# Patient Record
Sex: FEMALE | Race: WHITE | NOT HISPANIC OR LATINO | Employment: UNEMPLOYED
[De-identification: names, ages, dates, MRNs, and addresses within clinical notes are randomized per-mention and may not be internally consistent; named-entity substitution may affect disease eponyms.]

---
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
## 2024-06-23 ENCOUNTER — HEALTH MAINTENANCE LETTER (OUTPATIENT)
Age: 19
End: 2024-06-23

## 2024-07-18 ENCOUNTER — OFFICE VISIT (OUTPATIENT)
Dept: FAMILY MEDICINE | Facility: CLINIC | Age: 19
End: 2024-07-18
Payer: COMMERCIAL

## 2024-07-18 VITALS
BODY MASS INDEX: 22.66 KG/M2 | OXYGEN SATURATION: 98 % | HEIGHT: 66 IN | SYSTOLIC BLOOD PRESSURE: 98 MMHG | TEMPERATURE: 97.6 F | DIASTOLIC BLOOD PRESSURE: 70 MMHG | RESPIRATION RATE: 18 BRPM | WEIGHT: 141 LBS | HEART RATE: 83 BPM

## 2024-07-18 DIAGNOSIS — E10.9 TYPE 1 DIABETES MELLITUS WITHOUT COMPLICATION (H): Primary | ICD-10-CM

## 2024-07-18 DIAGNOSIS — G43.909 MIGRAINE WITHOUT STATUS MIGRAINOSUS, NOT INTRACTABLE, UNSPECIFIED MIGRAINE TYPE: ICD-10-CM

## 2024-07-18 PROBLEM — K90.0 CELIAC DISEASE: Status: ACTIVE | Noted: 2020-05-14

## 2024-07-18 LAB — HBA1C MFR BLD: 6.4 % (ref 0–5.6)

## 2024-07-18 PROCEDURE — 36415 COLL VENOUS BLD VENIPUNCTURE: CPT | Performed by: FAMILY MEDICINE

## 2024-07-18 PROCEDURE — 99204 OFFICE O/P NEW MOD 45 MIN: CPT | Performed by: FAMILY MEDICINE

## 2024-07-18 PROCEDURE — 83036 HEMOGLOBIN GLYCOSYLATED A1C: CPT | Performed by: FAMILY MEDICINE

## 2024-07-18 RX ORDER — INSULIN LISPRO 100 [IU]/ML
INJECTION, SOLUTION INTRAVENOUS; SUBCUTANEOUS
COMMUNITY
Start: 2023-12-25 | End: 2024-07-18

## 2024-07-18 RX ORDER — GLUCAGON 3 MG/1
1 POWDER NASAL
COMMUNITY
Start: 2024-01-22

## 2024-07-18 RX ORDER — RIZATRIPTAN BENZOATE 10 MG/1
TABLET, ORALLY DISINTEGRATING ORAL
Status: CANCELLED | OUTPATIENT
Start: 2024-07-18

## 2024-07-18 RX ORDER — PROCHLORPERAZINE 25 MG/1
SUPPOSITORY RECTAL
COMMUNITY

## 2024-07-18 RX ORDER — LANCETS
EACH MISCELLANEOUS
Qty: 400 EACH | Refills: 11 | Status: SHIPPED | OUTPATIENT
Start: 2024-07-18

## 2024-07-18 RX ORDER — INSULIN PMP CART,AUT,G6/7,CNTR
EACH SUBCUTANEOUS
COMMUNITY

## 2024-07-18 RX ORDER — RIZATRIPTAN BENZOATE 10 MG/1
TABLET, ORALLY DISINTEGRATING ORAL
COMMUNITY
Start: 2023-08-04

## 2024-07-18 RX ORDER — RIZATRIPTAN BENZOATE 10 MG/1
10 TABLET, ORALLY DISINTEGRATING ORAL
Qty: 18 TABLET | Refills: 1 | Status: SHIPPED | OUTPATIENT
Start: 2024-07-18

## 2024-07-18 RX ORDER — INSULIN LISPRO 100 [IU]/ML
INJECTION, SOLUTION INTRAVENOUS; SUBCUTANEOUS
Qty: 30 ML | Refills: 1 | Status: SHIPPED | OUTPATIENT
Start: 2024-07-18 | End: 2024-08-26

## 2024-07-18 NOTE — PROGRESS NOTES
Assessment & Plan     Type 1 diabetes mellitus without complication (H)  Well controlled, new referral sent to endocrinology. Refilled testing supplies and insulin for pump  - Insulin Disposable Pump (OMNIPOD 5 G6 PODS, GEN 5,) MISC  - blood glucose (NO BRAND SPECIFIED) test strip; One Touch  - Adult Endocrinology [facility] hospital Referral; Future  - insulin lispro (HUMALOG KWIKPEN) 100 UNIT/ML (1 unit dial) KWIKPEN; For use with insulin pump: Carb ratio 1:15, correction factor 1:50 over 150. Total daily dose is 30 units  - blood glucose (NO BRAND SPECIFIED) test strip; Use to test blood sugar 4 times daily or as directed. To accompany: Blood Glucose Monitor Brands: One Touch Ultra Verio  - thin (NO BRAND SPECIFIED) lancets; Use to test blood sugar 4 times daily or as directed. To accompany: Blood Glucose Monitor Brands: One Touch Ultra Verio  - Hemoglobin A1c; Future    Migraine without status migrainosus, not intractable, unspecified migraine type  Stable, using PRN rizatriptan  - rizatriptan (MAXALT-MLT) 10 MG ODT; Take 1 tablet (10 mg) by mouth at onset of headache for migraine May repeat in 2 hours. Max 3 tablets/24 hours.      Subjective   Ernestina is a 18 year old, presenting for the following health issues:  Diabetes and Referral (To Endocrinology)        7/18/2024     7:08 AM   Additional Questions   Roomed by Cynthia MCLAUGHLIN       History of Present Illness       Diabetes:   She presents for follow up of diabetes.   She is checking home blood glucose with a continuous glucose monitor.   She checks blood glucose before and after meals and at bedtime.  Blood glucose is sometimes over 200 and sometimes under 70. She is aware of hypoglycemia symptoms including shakiness, dizziness, weakness and lethargy.   She is concerned about other.    She is not experiencing numbness or burning in feet, excessive thirst, blurry vision, weight changes or redness, sores or blisters on feet.       She was diagnosed in 2023. Felt increased  "thirst.       Her blood sugar have been consistently in range. Was seeing Gustavo Perez MD.   She isn't seeing many low blood sugars but relates any due to mis-counting carbs- she was able to correct it quickly. When she eats late at night, will notice higher  blood sugars but trying to control that as well.       Diabetes comment:  Has new insurance- needs to re establish care. Was seen at [facility]. Also seen in [place], [place] goes there to school for college.     Headaches:   Since the patient's last clinic visit, headaches are: No change  The patient is getting headaches:  A few times a month  She is not able to do normal daily activities when she has a migraine.  The patient is taking the following rescue/relief medications:  Maxal   Patient states \"I get some relief\" from the rescue/relief medications.   The patient is taking the following medications to prevent migraines:  No medications to prevent migraines  In the past 4 weeks, the patient has gone to an Urgent Care or Emergency Room 0 times times due to headaches.    She gets migraines a couple times per month. HA starts light in the AM and gradually worsens. Usually migraines are frontal on the left side but can involve the entire head.  No vision changes, lights, spots. She does get nausea/vomiting. Also gets sound and light and smell sensitivity and will need to lay down.     She eats 4 or more servings of fruits and vegetables daily.She consumes 1 sweetened beverage(s) daily.She exercises with enough effort to increase her heart rate 30 to 60 minutes per day.  She exercises with enough effort to increase her heart rate 5 days per week.   She is taking medications regularly.             Review of Systems  Constitutional, HEENT, cardiovascular, pulmonary, gi and gu systems are negative, except as otherwise noted.      Objective    BP 98/70   Pulse 83   Temp 97.6  F (36.4  C)   Resp 18   Ht 1.676 m (5' 6\")   Wt 64 kg (141 lb)   LMP " 07/05/2024   SpO2 98%   BMI 22.76 kg/m    Body mass index is 22.76 kg/m .  Physical Exam   GENERAL: alert and no distress  RESP: lungs clear to auscultation - no rales, rhonchi or wheezes  CV: regular rates and rhythm, normal S1 S2, no S3 or S4, and no murmur, click or rub  MS: no gross musculoskeletal defects noted, no edema  PSYCH: mentation appears normal, affect normal/bright          Signed Electronically by: Steve Culp DO

## 2024-07-22 ENCOUNTER — TELEPHONE (OUTPATIENT)
Dept: ENDOCRINOLOGY | Facility: CLINIC | Age: 19
End: 2024-07-22
Payer: COMMERCIAL

## 2024-07-22 NOTE — TELEPHONE ENCOUNTER
Left Voicemail (1st Attempt) for the patient to call back and schedule the following:    Appointment type: New Diabetes   Provider: Lonnie   Return date: 7/23 in the 2 back to back return slots on hold   Specialty phone number: [phone]  Additional appointment(s) needed: NA   Additonal Notes: LVM, MyC x1  For Shila: New Type 1 Female, 18 year old, 2005  MRN: [id number]    Example:  7/23 Lonnie virtual csc (in the hold per caitie triana 2 return back to back slots)    Queenie Yeboah on 7/22/2024 at 8:10 AM

## 2024-08-26 ENCOUNTER — MYC REFILL (OUTPATIENT)
Dept: FAMILY MEDICINE | Facility: CLINIC | Age: 19
End: 2024-08-26
Payer: COMMERCIAL

## 2024-08-26 DIAGNOSIS — E10.9 TYPE 1 DIABETES MELLITUS WITHOUT COMPLICATION (H): ICD-10-CM

## 2024-08-27 RX ORDER — INSULIN LISPRO 100 [IU]/ML
INJECTION, SOLUTION INTRAVENOUS; SUBCUTANEOUS
Qty: 30 ML | Refills: 1 | Status: SHIPPED | OUTPATIENT
Start: 2024-08-27

## 2024-11-07 NOTE — CONFIDENTIAL NOTE
RECORDS RECEIVED FROM: internal    DATE RECEIVED: 11.27.24    NOTES (FOR ALL VISITS) STATUS DETAILS   OFFICE NOTES from referring provider internal    Steve Culp,       OFFICE NOTES from other specialist feliciano Murillo- 1.22.24 paramjit      MEDICATION LIST internal     LABS     DIABETES: HBGA1C, CREATININE, FASTING LIPIDS, MICROALBUMIN URINE, POTASSIUM, TSH, T4    THYROID: TSH, T4, CBC, THYRODLONULIN, TOTAL T3, FREE T4, CALCITONIN, CEA internal  HBGA1C- 7/18/24  Bmp- 3.11.24  Lipid- 3.11.24

## 2024-11-10 ENCOUNTER — HEALTH MAINTENANCE LETTER (OUTPATIENT)
Age: 19
End: 2024-11-10

## 2024-11-14 NOTE — PROGRESS NOTES
Outcome for 11/14/24 3:08 PM: Tackk message sent  Evangelina Julian MA  Outcome for 11/25/24 1:20 PM: Per patient, will upload device before appointment  Bobbilynn Grossaint, VF  Outcome for 11/26/24 10:27 AM: Data uploaded on [email or web address]  Bobbilynn Grossaint, VF

## 2024-11-25 ENCOUNTER — TELEPHONE (OUTPATIENT)
Dept: ENDOCRINOLOGY | Facility: CLINIC | Age: 19
End: 2024-11-25
Payer: COMMERCIAL

## 2024-11-25 NOTE — PATIENT INSTRUCTIONS
New Diabetes Patient Info  Welcome to the Diabetes Optimization Program at the Endocrinology and Diabetes Clinic at [facility]!    Our Diabetes Optimization Program is here to provide you with a team-based, collaborative approach to optimize your diabetes management. The team is made up of Endocrinologists and Physician Assistants here at the Clinic, your Primary Care Physician, Certified Diabetes Educators, Registered Nurses, Medical Assistants, Emergency Medical Technicians and Visit Facilitators.     During your care with us, we promise to:   Work with you and your Primary Care Provider Steve Culp, DO Ref Provider (PCP)  to optimize your diabetes management.   Provide you with the tools and support you need to achieve a healthier lifestyle  Help you to control your diabetes by offering new treatments, education, and support to improve your diabetes management  Help you graduate back to your primary care provider for ongoing care once your diabetes is under control      Endocrinology Clinics Phone Number: [phone]    [facility] Address:   [facility] Address:     [address] [place] [id number] [place] [address]

## 2024-11-25 NOTE — TELEPHONE ENCOUNTER
Per patient she got the instructions and will upload both before appt time. Pt agreed to message back or call with any questions. Bobbilynn Grossaint on 11/25/2024 at 1:21 PM

## 2024-11-27 ENCOUNTER — VIRTUAL VISIT (OUTPATIENT)
Dept: ENDOCRINOLOGY | Facility: CLINIC | Age: 19
End: 2024-11-27
Payer: COMMERCIAL

## 2024-11-27 ENCOUNTER — PRE VISIT (OUTPATIENT)
Dept: ENDOCRINOLOGY | Facility: CLINIC | Age: 19
End: 2024-11-27

## 2024-11-27 DIAGNOSIS — E10.9 TYPE 1 DIABETES MELLITUS WITHOUT COMPLICATION (H): ICD-10-CM

## 2024-11-27 PROCEDURE — 99204 OFFICE O/P NEW MOD 45 MIN: CPT | Mod: 95 | Performed by: PHYSICIAN ASSISTANT

## 2024-11-27 RX ORDER — INSULIN LISPRO 100 [IU]/ML
INJECTION, SOLUTION INTRAVENOUS; SUBCUTANEOUS
Qty: 15 ML | Refills: 5 | Status: SHIPPED | OUTPATIENT
Start: 2024-11-27

## 2024-11-27 RX ORDER — INSULIN PMP CART,AUT,G6/7,CNTR
EACH SUBCUTANEOUS
Qty: 45 EACH | Refills: 3 | COMMUNITY
Start: 2024-11-27 | End: 2024-11-27

## 2024-11-27 RX ORDER — ACYCLOVIR 400 MG/1
TABLET ORAL
Qty: 9 EACH | Refills: 5 | Status: SHIPPED | OUTPATIENT
Start: 2024-11-27

## 2024-11-27 RX ORDER — INSULIN PMP CART,AUT,G6/7,CNTR
1 EACH SUBCUTANEOUS
Qty: 45 EACH | Refills: 3 | Status: SHIPPED | OUTPATIENT
Start: 2024-11-27

## 2024-11-27 RX ORDER — PROCHLORPERAZINE 25 MG/1
SUPPOSITORY RECTAL
Qty: 1 EACH | Refills: 4 | Status: SHIPPED | OUTPATIENT
Start: 2024-11-27

## 2024-11-27 RX ORDER — PROCHLORPERAZINE 25 MG/1
SUPPOSITORY RECTAL
Qty: 1 EACH | Refills: 1 | OUTPATIENT
Start: 2024-11-27 | End: 2024-11-27

## 2024-11-27 ASSESSMENT — PAIN SCALES - GENERAL: PAINLEVEL_OUTOF10: NO PAIN (0)

## 2024-11-27 NOTE — LETTER
11/27/2024       RE: Ernestina Rosales  [id number] [id number]     Dear Colleague,    Thank you for referring your patient, Ernestina Rosales, to the [facility] ENDOCRINOLOG[facility] [place] [facility]. Please see a copy of my visit note below.    Outcome for 11/14/24 3:08 PM: [email or web address] message sent  Evangelina Julian MA  Outcome for 11/25/24 1:20 PM: Per patient, will upload device before appointment  Bobbilynn Grossaint, VF  Outcome for 11/26/24 10:27 AM: Data uploaded on [employer]  Bobbilynn Grossaint, VF                                Type of service:  Video Visit     Originating Location (pt. Location):     Distant Location (provider location):    Platform used for Video Visit:       Time of start: 2:26 PM  Time of end: 2:48 PM  Total duration of video visit: 21 minutes.  Providers location: Off-site.  Patient's location: [place].    HPI  Ernestina Rosales is a 19-year-old female with type 1 diabetes mellitus being seen today as a new patient for diabetes evaluation and management.  Patient had been followed at [facility] for her diabetes, but now has new medical insurance and can no longer be seen at [facility] for her medical care.  She is currently in [place] and attends college in [place] and will be graduating in December 2024 with a degree in psychology.  She plans to move back to [place] after graduating from college.  Patient gives a history of type 1 diabetes mellitus diagnosed in November 2023.  Patient presented with polyuria, polydipsia, weight loss and DKA at time of diagnosis.  ALYSE Ab + .  Ernestina denies history of diabetic retinopathy, nephropathy or diabetic peripheral neuropathy.  Patient has past history of generalized anxiety disorder and migraines.  She has also been told she has celiac disease.  For her diabetes, patient is using an OmniPod 5 insulin pump and DexcomG6 sensor.  I placed an order today for the  DexcomG7 sensor.  Her basal rate is set at 0.4 units/hr x 24 hours, I/C ratio is 1:13 and CF 40.  Recent A1C was 6.2 % on 3/11/2024.  I reviewed and scanned patient's insulin pump download data in her note below.  Average glucose 133 with estimated A1C 6.5 %.  Glucose is in target range 91% of the time with no hypoglycemia.  On ROS today, no complaints.  Patient denies symptoms of diabetic neuropathy or foot ulcers.  Patient denies history of CVA, blurred vision, nausea, vomiting, history of thyroid disease, shortness of breath at rest, cough or fever.  No chest pain or cardiac history.  Patient denies abdominal pain, diarrhea, constipation, blood in stool or melena.  No dysuria or hematuria.  Denies hx of HTN.    Diabetes Care  Retinopathy: None.  Patient seen by ophthalmology in March 2024 with no evidence of diabetic retinopathy.  Nephropathy: None.  Urine protein negative in March 2024.  Neuropathy: None.  Foot Exam: No exam today.  Taking aspirin: No.  Lipids: LDL 83 in March 2024.  Insulin: OmniPod5 insulin pump.  Testing: DexcomG6 sensor.  DexcomG7 sensor ordered today.  Hypoglycemia treatment: Baqsimi.  Back up insulin: Patient has basal insulin for backup in case insulin pump fails.        ROS  See under history of present illness.    Allergies  Allergies   Allergen Reactions     No Known Allergies        Medications  Current Outpatient Medications   Medication Sig Dispense Refill     blood glucose (NO BRAND SPECIFIED) lancets standard Dispense glucose meter, test strips and lancets covered by the patient insurance. Test 4 times per day in case of sensor failure.       blood glucose (NO BRAND SPECIFIED) test strip Use to test blood sugar 4 times daily or as directed. To accompany: Blood Glucose Monitor Brands: One Touch Ultra Verio 50 strip 11     blood glucose (NO BRAND SPECIFIED) test strip One Touch       Continuous Glucose Sensor (DEXCOM G7 SENSOR) MISC Change every 10 days. 9 each 5     Continuous  Glucose Transmitter (DEXCOM G6 TRANSMITTER) MISC Change every 3 months. 1 each 4     Glucagon (BAQSIMI) 3 MG/DOSE nasal powder Spray 1 spray (3 mg) in nostril as needed. in the event of unconscious hypoglycemia or hypoglycemic seizure. May repeat dose if no response after 15 minutes. 1 each 1     Insulin Disposable Pump (OMNIPOD 5 PODS, GEN 5,) MISC 1 pod every 3 days. 45 each 3     insulin lispro (HUMALOG KWIKPEN) 100 UNIT/ML (1 unit dial) KWIKPEN For use with insulin pump: Carb ratio 1:15, correction factor 1:50 over 150. Total daily dose is 15 units. 15 mL 5     rizatriptan (MAXALT-MLT) 10 MG ODT DISSOLVE ONE TABLET IN MOUTH AS NEEDED FOR MIGRAINE       rizatriptan (MAXALT-MLT) 10 MG ODT Take 1 tablet (10 mg) by mouth at onset of headache for migraine May repeat in 2 hours. Max 3 tablets/24 hours. 18 tablet 1     thin (NO BRAND SPECIFIED) lancets Use to test blood sugar 4 times daily or as directed. To accompany: Blood Glucose Monitor Brands: One Touch Ultra Verio 400 each 11       Family History  family history includes Anxiety Disorder in her father, mother, and sister; Arthritis in her mother; Depression in her father, mother, and sister; Hyperlipidemia in her maternal grandfather, maternal grandmother, and paternal grandfather; Hypertension in her maternal grandfather, maternal grandmother, and paternal grandfather; Lymphoma in her paternal grandmother; Other Cancer in her maternal grandfather; Substance Abuse in her father, maternal grandfather, maternal grandmother, and paternal grandfather.    Mother's cousin may have had type 1 diabetes.    Social History   reports that she has never smoked. She has never used smokeless tobacco. She reports current alcohol use. She reports current drug use. Drug: Marijuana.     Smoke= none.  Alcohol use= rare.  Single with no children.  Attending college in [place] and will be graduating in December 2024 with a degree in psychology.  Exercise= yes.    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     Diabetes (H) 11/16/2023   Type 1 diabetes mellitus diagnosed November 2023.  Past history of generalized anxiety disorder.  Oral surgery.    No past surgical history on file.    Physical Exam    No exam today.    RESULTS  Hemoglobin A1C   Date Value Ref Range Status   11/29/2024 6.7 (H) 0.0 - 5.6 % Final     Comment:     Normal <5.7%   Prediabetes 5.7-6.4%    Diabetes 6.5% or higher     Note: Adopted from ADA consensus guidelines.     TSH   Date Value Ref Range Status   11/29/2024 1.84 0.30 - 4.20 uIU/mL Final     ALYSE 65 + in 3/2024.      ASSESSMENT/PLAN:     TYPE 1 DIABETES MELLITUS: 19-year-old female with well-controlled type 1 diabetes mellitus with no known microvascular complications.  Patient's glucose is in target range 91 % of the time with no frequent hypoglycemia.  Will continue current insulin pump settings.  Patient was seen by ophthalmology in March 2024 with no evidence of diabetic retinopathy.  Urine microalbuminuria negative in March 2024 with creatinine 0.70/GFR > 90 mL/min in 3/2024.  Patient denies symptoms of diabetic peripheral neuropathy or foot ulcers.  LDL 83 in March 2024.  No vitals today.  CELIAC: Patient has been told she has celiac.  She avoids gluten.  Celiac labs ordered today.  HEALTH MAINTENANCE: See primary care provider for health maintenance.  FOLLOW UP: With me in 5 months.  A1C, TSH and celiac labs ordered today.  DexcomG7 sensors and OmniPod5 pods ordered today.  RX for Baqsimi sent to pharmacy.    Time spent reviewing chart, labs and OmniPod5 insulin pump download data today= 5 minutes.  Time for video visit today= 21 minutes.  Per documentation today= 15 minutes.    Total time for visit today= 41 minutes.    Marli Cody PA-C      Again, thank you for allowing me to participate in the care of your patient.      Sincerely,    Marli Cody PA-C

## 2024-11-27 NOTE — PROGRESS NOTES
Type of service:  Video Visit     Originating Location (pt. Location):     Distant Location (provider location):    Platform used for Video Visit:

## 2024-11-30 NOTE — PROGRESS NOTES
Time of start: 2:26 PM  Time of end: 2:48 PM  Total duration of video visit: 21 minutes.  Providers location: Off-site.  Patient's location: [place].    HPI  Ernestina Rosales is a 19-year-old female with type 1 diabetes mellitus being seen today as a new patient for diabetes evaluation and management.  Patient had been followed at [facility] for her diabetes, but now has new medical insurance and can no longer be seen at [facility] for her medical care.  She is currently in [place] and attends college in [place] and will be graduating in December 2024 with a degree in psychology.  She plans to move back to [place] after graduating from college.  Patient gives a history of type 1 diabetes mellitus diagnosed in November 2023.  Patient presented with polyuria, polydipsia, weight loss and DKA at time of diagnosis.  ALYSE Ab + .  Ernestina denies history of diabetic retinopathy, nephropathy or diabetic peripheral neuropathy.  Patient has past history of generalized anxiety disorder and migraines.  She has also been told she has celiac disease.  For her diabetes, patient is using an OmniPod 5 insulin pump and DexcomG6 sensor.  I placed an order today for the DexcomG7 sensor.  Her basal rate is set at 0.4 units/hr x 24 hours, I/C ratio is 1:13 and CF 40.  Recent A1C was 6.2 % on 3/11/2024.  I reviewed and scanned patient's insulin pump download data in her note below.  Average glucose 133 with estimated A1C 6.5 %.  Glucose is in target range 91% of the time with no hypoglycemia.  On ROS today, no complaints.  Patient denies symptoms of diabetic neuropathy or foot ulcers.  Patient denies history of CVA, blurred vision, nausea, vomiting, history of thyroid disease, shortness of breath at rest, cough or fever.  No chest pain or cardiac history.  Patient denies abdominal pain, diarrhea, constipation, blood in stool or melena.  No dysuria or hematuria.  Denies hx of HTN.    Diabetes Care  Retinopathy: None.  Patient seen by ophthalmology  in March 2024 with no evidence of diabetic retinopathy.  Nephropathy: None.  Urine protein negative in March 2024.  Neuropathy: None.  Foot Exam: No exam today.  Taking aspirin: No.  Lipids: LDL 83 in March 2024.  Insulin: OmniPod5 insulin pump.  Testing: DexcomG6 sensor.  DexcomG7 sensor ordered today.  Hypoglycemia treatment: Baqsimi.  Back up insulin: Patient has basal insulin for backup in case insulin pump fails.        ROS  See under history of present illness.    Allergies  Allergies   Allergen Reactions    No Known Allergies        Medications  Current Outpatient Medications   Medication Sig Dispense Refill    blood glucose (NO BRAND SPECIFIED) lancets standard Dispense glucose meter, test strips and lancets covered by the patient insurance. Test 4 times per day in case of sensor failure.      blood glucose (NO BRAND SPECIFIED) test strip Use to test blood sugar 4 times daily or as directed. To accompany: Blood Glucose Monitor Brands: One Touch Ultra Verio 50 strip 11    blood glucose (NO BRAND SPECIFIED) test strip One Touch      Continuous Glucose Sensor (DEXCOM G7 SENSOR) MISC Change every 10 days. 9 each 5    Continuous Glucose Transmitter (DEXCOM G6 TRANSMITTER) MISC Change every 3 months. 1 each 4    Glucagon (BAQSIMI) 3 MG/DOSE nasal powder Spray 1 spray (3 mg) in nostril as needed. in the event of unconscious hypoglycemia or hypoglycemic seizure. May repeat dose if no response after 15 minutes. 1 each 1    Insulin Disposable Pump (OMNIPOD 5 PODS, GEN 5,) MISC 1 pod every 3 days. 45 each 3    insulin lispro (HUMALOG KWIKPEN) 100 UNIT/ML (1 unit dial) KWIKPEN For use with insulin pump: Carb ratio 1:15, correction factor 1:50 over 150. Total daily dose is 15 units. 15 mL 5    rizatriptan (MAXALT-MLT) 10 MG ODT DISSOLVE ONE TABLET IN MOUTH AS NEEDED FOR MIGRAINE      rizatriptan (MAXALT-MLT) 10 MG ODT Take 1 tablet (10 mg) by mouth at onset of headache for migraine May repeat in 2 hours. Max 3 tablets/24  hours. 18 tablet 1    thin (NO BRAND SPECIFIED) lancets Use to test blood sugar 4 times daily or as directed. To accompany: Blood Glucose Monitor Brands: One Touch Ultra Verio 400 each 11       Family History  family history includes Anxiety Disorder in her father, mother, and sister; Arthritis in her mother; Depression in her father, mother, and sister; Hyperlipidemia in her maternal grandfather, maternal grandmother, and paternal grandfather; Hypertension in her maternal grandfather, maternal grandmother, and paternal grandfather; Lymphoma in her paternal grandmother; Other Cancer in her maternal grandfather; Substance Abuse in her father, maternal grandfather, maternal grandmother, and paternal grandfather.    Mother's cousin may have had type 1 diabetes.    Social History   reports that she has never smoked. She has never used smokeless tobacco. She reports current alcohol use. She reports current drug use. Drug: Marijuana.     Smoke= none.  Alcohol use= rare.  Single with no children.  Attending college in [place] and will be graduating in December 2024 with a degree in psychology.  Exercise= yes.    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    Diabetes (H) 11/16/2023   Type 1 diabetes mellitus diagnosed November 2023.  Past history of generalized anxiety disorder.  Oral surgery.    No past surgical history on file.    Physical Exam    No exam today.    RESULTS  Hemoglobin A1C   Date Value Ref Range Status   11/29/2024 6.7 (H) 0.0 - 5.6 % Final     Comment:     Normal <5.7%   Prediabetes 5.7-6.4%    Diabetes 6.5% or higher     Note: Adopted from ADA consensus guidelines.     TSH   Date Value Ref Range Status   11/29/2024 1.84 0.30 - 4.20 uIU/mL Final     ALYSE 65 + in 3/2024.      ASSESSMENT/PLAN:     TYPE 1 DIABETES MELLITUS: 19-year-old female with well-controlled type 1 diabetes mellitus with no known microvascular complications.   Patient's glucose is in target range 91 % of the time with no frequent hypoglycemia.  Will continue current insulin pump settings.  Patient was seen by ophthalmology in March 2024 with no evidence of diabetic retinopathy.  Urine microalbuminuria negative in March 2024 with creatinine 0.70/GFR > 90 mL/min in 3/2024.  Patient denies symptoms of diabetic peripheral neuropathy or foot ulcers.  LDL 83 in March 2024.  No vitals today.  CELIAC: Patient has been told she has celiac.  She avoids gluten.  Celiac labs ordered today.  HEALTH MAINTENANCE: See primary care provider for health maintenance.  FOLLOW UP: With me in 5 months.  A1C, TSH and celiac labs ordered today.  DexcomG7 sensors and OmniPod5 pods ordered today.  RX for Baqsimi sent to pharmacy.    Time spent reviewing chart, labs and OmniPod5 insulin pump download data today= 5 minutes.  Time for video visit today= 21 minutes.  Per documentation today= 15 minutes.    Total time for visit today= 41 minutes.    Marli Cody PA-C

## 2024-12-19 ENCOUNTER — TELEPHONE (OUTPATIENT)
Dept: ENDOCRINOLOGY | Facility: CLINIC | Age: 19
End: 2024-12-19
Payer: COMMERCIAL

## 2024-12-19 NOTE — TELEPHONE ENCOUNTER
Left Voicemail (1st Attempt) for the patient to call back and schedule the following:    Appointment type: return diabetes   Provider: Glo   Return date: 5 m on or near 4/27/25   Specialty phone number: [phone]  Additional appointment(s) needed:   Additonal Notes: LVM, MyC x1   Check Out Comments from 11/27 visit:   Appt with me in 5 months.  Labs ordered. (Done)        Queenie Yeboah on 12/19/2024 at 1:27 PM

## 2025-01-27 ENCOUNTER — TELEPHONE (OUTPATIENT)
Dept: ENDOCRINOLOGY | Facility: CLINIC | Age: 20
End: 2025-01-27
Payer: COMMERCIAL

## 2025-01-27 NOTE — TELEPHONE ENCOUNTER
[facility] Call Center    Phone Message    May a detailed message be left on voicemail: yes     Reason for Call: Medication Question or concern regarding medication   Prescription Clarification  Name of Medication: insulin lispro (HUMALOG KWIKPEN) 100 UNIT/ML (1 unit dial) KWIKPEN   Prescribing Provider: Glo   Pharmacy:  [id number] [place], [place] - [address] [address]     What on the order needs clarification? Quantity is off, the quantity should be 1 box per month, please call if questions.        Action Taken: Message routed to:  [facility] Endo    Travel Screening: Not Applicable     Date of Service:

## 2025-03-02 ENCOUNTER — HEALTH MAINTENANCE LETTER (OUTPATIENT)
Age: 20
End: 2025-03-02

## 2025-03-18 ENCOUNTER — TELEPHONE (OUTPATIENT)
Dept: ENDOCRINOLOGY | Facility: CLINIC | Age: 20
End: 2025-03-18
Payer: COMMERCIAL

## 2025-03-18 DIAGNOSIS — E10.9 TYPE 1 DIABETES MELLITUS WITHOUT COMPLICATION (H): ICD-10-CM

## 2025-03-18 NOTE — TELEPHONE ENCOUNTER
[facility] Call Center    Phone Message    May a detailed message be left on voicemail: yes     Reason for Call: Medication Question or concern regarding medication   Prescription Clarification  Name of Medication: insulin lispro (HUMALOG KWIKPEN) 100 UNIT/ML (1 unit dial) KWIKPEN   Prescribing Provider: Marli Cody PA-C   Pharmacy:    [id number] [address] [facility] [address]     What on the order needs clarification? Patient calling to discuss her prescription insulin lispro (HUMALOG KWIKPEN) 100 UNIT/ML (1 unit dial) KWIKPEN. It needs to be rewritten for 45units a day so that she can a box a month. She would like a call back if needed otherwise please send a new Rx to the pharmacy.       Action Taken: Message routed to:  [facility]): ENDO    Travel Screening: Not Applicable     Date of Service:

## 2025-03-18 NOTE — TELEPHONE ENCOUNTER
Patient confirmed scheduled appointment:  Date: 4/28/25  Time: 1:30 pm  Visit type: RETURN DIABETES  Provider: FLOR Portillo  Location: [facility]  Testing/imaging: N/A  Additional notes:  Spoke to pt to reschedule appt on 4/2 due to changes in the providers schedule.  The pt has only been seen once as a new diabetes pt with Marli oCdy and due to work issues can only be seen on Mondays which Marli is not available for.  Scheduled pt as a return diabetes with a new provider to continue her care.      Eulalio Woodson on 3/18/2025 at 3:11 PM

## 2025-03-19 DIAGNOSIS — E10.9 TYPE 1 DIABETES MELLITUS WITHOUT COMPLICATION (H): ICD-10-CM

## 2025-03-19 RX ORDER — INSULIN LISPRO 100 [IU]/ML
INJECTION, SOLUTION INTRAVENOUS; SUBCUTANEOUS
Qty: 15 ML | Refills: 5 | Status: CANCELLED | OUTPATIENT
Start: 2025-03-19

## 2025-03-19 RX ORDER — INSULIN LISPRO 100 [IU]/ML
INJECTION, SOLUTION INTRAVENOUS; SUBCUTANEOUS
Qty: 45 ML | Refills: 0 | Status: SHIPPED | OUTPATIENT
Start: 2025-03-19

## 2025-03-19 NOTE — TELEPHONE ENCOUNTER
insulin lispro (HUMALOG KWIKPEN) 100 UNIT/ML (1 unit dial) KWIKPEN 15 mL 5 11/27/2024 -- No   Sig: For use with insulin pump: Carb ratio 1:15, correction factor 1:50 over 150. Total daily dose is 15 units.     ----------------------  Last Visit Date: 11/27/24  Future Visit Date: 4/28/25  ----------------------    See message from patient regarding changing directions.     Shreya Ybarra RN  [facility] Triage & Med Refill Team           Request from pharmacy:  Requested Prescriptions   Pending Prescriptions Disp Refills    insulin lispro (HUMALOG KWIKPEN) 100 UNIT/ML (1 unit dial) KWIKPEN 15 mL 5     Sig: For use with insulin pump: Carb ratio 1:15, correction factor 1:50 over 150. Total daily dose is 15 units.       There is no refill protocol information for this order

## 2025-03-19 NOTE — TELEPHONE ENCOUNTER
Increased dose sent to Dr Gurrola to sign off on  for 45 units daily Jeannine Covington RN on 3/19/2025 at 3:28 PM

## 2025-03-19 NOTE — TELEPHONE ENCOUNTER
I see a basal rate of 0.4 / 24 hours which comes to 20 units daily  she is requesting the Humalog   to say max 45 units per day to get more . Can you figure out maybe I'm missing something . I tried calling her with no answer to discuss . Jeannine Covington, RN on 3/19/2025 at 12:06 PM

## 2025-03-25 ENCOUNTER — TELEPHONE (OUTPATIENT)
Dept: ENDOCRINOLOGY | Facility: CLINIC | Age: 20
End: 2025-03-25
Payer: COMMERCIAL

## 2025-03-25 DIAGNOSIS — E10.9 TYPE 1 DIABETES MELLITUS WITHOUT COMPLICATION (H): Primary | ICD-10-CM

## 2025-03-25 NOTE — TELEPHONE ENCOUNTER
[facility] Call Center    Phone Message    May a detailed message be left on voicemail: yes     Reason for Call: Other: Pt has a lab appt on 3/28/25 and she is requesting for the lab to be placed prior. Please advise.      Action Taken: Other: Endo    Travel Screening: Not Applicable     Date of Service: 3/25/25